# Patient Record
Sex: FEMALE | Race: WHITE
[De-identification: names, ages, dates, MRNs, and addresses within clinical notes are randomized per-mention and may not be internally consistent; named-entity substitution may affect disease eponyms.]

---

## 2017-06-05 ENCOUNTER — HOSPITAL ENCOUNTER (EMERGENCY)
Dept: HOSPITAL 42 - ED | Age: 27
Discharge: HOME | End: 2017-06-05
Payer: COMMERCIAL

## 2017-06-05 VITALS
TEMPERATURE: 99.6 F | SYSTOLIC BLOOD PRESSURE: 138 MMHG | OXYGEN SATURATION: 100 % | DIASTOLIC BLOOD PRESSURE: 89 MMHG | RESPIRATION RATE: 18 BRPM | HEART RATE: 81 BPM

## 2017-06-05 VITALS — BODY MASS INDEX: 18.8 KG/M2

## 2017-06-05 DIAGNOSIS — J20.9: Primary | ICD-10-CM

## 2018-02-27 ENCOUNTER — HOSPITAL ENCOUNTER (EMERGENCY)
Dept: HOSPITAL 42 - ED | Age: 28
LOS: 1 days | Discharge: HOME | End: 2018-02-28
Payer: COMMERCIAL

## 2018-02-27 VITALS — BODY MASS INDEX: 19 KG/M2

## 2018-02-27 VITALS — TEMPERATURE: 98.3 F | RESPIRATION RATE: 18 BRPM

## 2018-02-27 DIAGNOSIS — H74.92: ICD-10-CM

## 2018-02-27 DIAGNOSIS — G43.909: Primary | ICD-10-CM

## 2018-02-27 LAB
ALBUMIN SERPL-MCNC: 4.7 G/DL (ref 3–4.8)
ALBUMIN/GLOB SERPL: 1.2 {RATIO} (ref 1.1–1.8)
ALT SERPL-CCNC: 30 U/L (ref 7–56)
AST SERPL-CCNC: 29 U/L (ref 14–36)
BUN SERPL-MCNC: 15 MG/DL (ref 7–21)
CALCIUM SERPL-MCNC: 9.9 MG/DL (ref 8.4–10.5)
ERYTHROCYTE [DISTWIDTH] IN BLOOD BY AUTOMATED COUNT: 15.3 % (ref 11.5–14.5)
GFR NON-AFRICAN AMERICAN: > 60
HGB BLD-MCNC: 13.5 G/DL (ref 12–16)
LIPASE SERPL-CCNC: 101 U/L (ref 23–300)
MCH RBC QN AUTO: 27 PG (ref 25–35)
MCHC RBC AUTO-ENTMCNC: 32.8 G/DL (ref 31–37)
MCV RBC AUTO: 82.4 FL (ref 80–105)
PLATELET # BLD: 292 10^3/UL (ref 120–450)
PMV BLD AUTO: 10.2 FL (ref 7–11)
RBC # BLD AUTO: 5 10^6/UL (ref 3.5–6.1)
WBC # BLD AUTO: 7.8 10^3/UL (ref 4.5–11)

## 2018-02-27 PROCEDURE — 83690 ASSAY OF LIPASE: CPT

## 2018-02-27 PROCEDURE — 80053 COMPREHEN METABOLIC PANEL: CPT

## 2018-02-27 PROCEDURE — 96374 THER/PROPH/DIAG INJ IV PUSH: CPT

## 2018-02-27 PROCEDURE — 96375 TX/PRO/DX INJ NEW DRUG ADDON: CPT

## 2018-02-27 PROCEDURE — 85027 COMPLETE CBC AUTOMATED: CPT

## 2018-02-27 PROCEDURE — 70450 CT HEAD/BRAIN W/O DYE: CPT

## 2018-02-27 PROCEDURE — 99285 EMERGENCY DEPT VISIT HI MDM: CPT

## 2018-02-28 VITALS — OXYGEN SATURATION: 100 % | SYSTOLIC BLOOD PRESSURE: 128 MMHG | HEART RATE: 88 BPM | DIASTOLIC BLOOD PRESSURE: 88 MMHG

## 2018-10-14 ENCOUNTER — HOSPITAL ENCOUNTER (EMERGENCY)
Dept: HOSPITAL 42 - ED | Age: 28
LOS: 1 days | Discharge: HOME | End: 2018-10-15
Payer: COMMERCIAL

## 2018-10-14 VITALS — RESPIRATION RATE: 18 BRPM

## 2018-10-14 VITALS — BODY MASS INDEX: 19 KG/M2

## 2018-10-14 DIAGNOSIS — N39.0: Primary | ICD-10-CM

## 2018-10-14 DIAGNOSIS — N83.209: ICD-10-CM

## 2018-10-14 LAB
ALBUMIN SERPL-MCNC: 4.8 G/DL (ref 3–4.8)
ALBUMIN/GLOB SERPL: 1.3 {RATIO} (ref 1.1–1.8)
ALT SERPL-CCNC: 38 U/L (ref 7–56)
AMORPH SED URNS QL MICRO: (no result)
APPEARANCE UR: CLEAR
AST SERPL-CCNC: 31 U/L (ref 14–36)
BACTERIA #/AREA URNS HPF: (no result) /[HPF]
BASOPHILS # BLD AUTO: 0.04 K/MM3 (ref 0–2)
BASOPHILS NFR BLD: 0.3 % (ref 0–3)
BILIRUB UR-MCNC: NEGATIVE MG/DL
BUN SERPL-MCNC: 11 MG/DL (ref 7–21)
CALCIUM SERPL-MCNC: 9.6 MG/DL (ref 8.4–10.5)
COLOR UR: YELLOW
EOSINOPHIL # BLD: 0.1 10*3/UL (ref 0–0.7)
EOSINOPHIL NFR BLD: 1.1 % (ref 1.5–5)
ERYTHROCYTE [DISTWIDTH] IN BLOOD BY AUTOMATED COUNT: 13.4 % (ref 11.5–14.5)
GFR NON-AFRICAN AMERICAN: > 60
GLUCOSE UR STRIP-MCNC: NEGATIVE MG/DL
GRANULOCYTES # BLD: 8.17 10*3/UL (ref 1.4–6.5)
GRANULOCYTES NFR BLD: 66.9 % (ref 50–68)
HGB BLD-MCNC: 13.6 G/DL (ref 12–16)
LEUKOCYTE ESTERASE UR-ACNC: (no result) LEU/UL
LIPASE SERPL-CCNC: 128 U/L (ref 23–300)
LYMPHOCYTES # BLD: 3.2 10*3/UL (ref 1.2–3.4)
LYMPHOCYTES NFR BLD AUTO: 25.9 % (ref 22–35)
MCH RBC QN AUTO: 28 PG (ref 25–35)
MCHC RBC AUTO-ENTMCNC: 33.6 G/DL (ref 31–37)
MCV RBC AUTO: 83.3 FL (ref 80–105)
MONOCYTES # BLD AUTO: 0.7 10*3/UL (ref 0.1–0.6)
MONOCYTES NFR BLD: 5.8 % (ref 1–6)
PH UR STRIP: 6 [PH] (ref 4.7–8)
PLATELET # BLD: 292 10^3/UL (ref 120–450)
PMV BLD AUTO: 9.4 FL (ref 7–11)
PROT UR STRIP-MCNC: NEGATIVE MG/DL
RBC # BLD AUTO: 4.86 10^6/UL (ref 3.5–6.1)
RBC # UR STRIP: (no result) /UL
SP GR UR STRIP: <= 1.005 (ref 1–1.03)
UROBILINOGEN UR STRIP-ACNC: 0.2 E.U./DL
WBC # BLD AUTO: 12.2 10^3/UL (ref 4.5–11)
WBC #/AREA URNS HPF: (no result) /HPF (ref 0–6)

## 2018-10-14 PROCEDURE — 99283 EMERGENCY DEPT VISIT LOW MDM: CPT

## 2018-10-14 PROCEDURE — 83735 ASSAY OF MAGNESIUM: CPT

## 2018-10-14 PROCEDURE — 87086 URINE CULTURE/COLONY COUNT: CPT

## 2018-10-14 PROCEDURE — 81001 URINALYSIS AUTO W/SCOPE: CPT

## 2018-10-14 PROCEDURE — 76830 TRANSVAGINAL US NON-OB: CPT

## 2018-10-14 PROCEDURE — 85025 COMPLETE CBC W/AUTO DIFF WBC: CPT

## 2018-10-14 PROCEDURE — 87181 SC STD AGAR DILUTION PER AGT: CPT

## 2018-10-14 PROCEDURE — 96374 THER/PROPH/DIAG INJ IV PUSH: CPT

## 2018-10-14 PROCEDURE — 80053 COMPREHEN METABOLIC PANEL: CPT

## 2018-10-14 PROCEDURE — 83690 ASSAY OF LIPASE: CPT

## 2018-10-15 VITALS
TEMPERATURE: 98.6 F | HEART RATE: 78 BPM | DIASTOLIC BLOOD PRESSURE: 74 MMHG | OXYGEN SATURATION: 99 % | SYSTOLIC BLOOD PRESSURE: 115 MMHG

## 2018-10-22 ENCOUNTER — HOSPITAL ENCOUNTER (OUTPATIENT)
Dept: HOSPITAL 31 - C.SDS | Age: 28
Discharge: HOME | End: 2018-10-22
Attending: OBSTETRICS & GYNECOLOGY
Payer: COMMERCIAL

## 2018-10-22 VITALS — TEMPERATURE: 97.6 F

## 2018-10-22 VITALS — BODY MASS INDEX: 20.5 KG/M2

## 2018-10-22 VITALS
SYSTOLIC BLOOD PRESSURE: 112 MMHG | OXYGEN SATURATION: 99 % | DIASTOLIC BLOOD PRESSURE: 68 MMHG | HEART RATE: 65 BPM | RESPIRATION RATE: 18 BRPM

## 2018-10-22 DIAGNOSIS — D25.9: Primary | ICD-10-CM

## 2018-10-22 DIAGNOSIS — N92.1: ICD-10-CM

## 2018-10-22 DIAGNOSIS — N84.0: ICD-10-CM

## 2018-10-22 PROCEDURE — 88305 TISSUE EXAM BY PATHOLOGIST: CPT

## 2018-10-22 PROCEDURE — 58558 HYSTEROSCOPY BIOPSY: CPT

## 2018-10-22 NOTE — [id number]
Surgeon's Initial Post Op Note





- Surgeon's Notes


Surgeon: Dr. Maguire


Assistant: Brant


Type of Anesthesia: General LMA


Anesthesia Administered By: Dr. Montgomery


Pre-Operative Diagnosis: 27 yo with Fibroid uterus , menorrhagia, irregular 

mentrual cycle


Operative Findings: Hysteroscopy Myoure D and C


Post-Operative Diagnosis: Same as above


Operation Performed: hysteroscopy Myosure D and C


Specimen/Specimens Removed: EMC, ECC, Polyp


Estimated Blood Loss: EBL {In ML}: 10


Blood Products Given: N/A


Drains Used: No Drains


Post-Op Condition: Good


Date of Surgery/Procedure: 10/22/18


Time of Surgery/Procedure: 10:41

## 2018-10-23 NOTE — OP
PROCEDURE DATE:  10/22/2018



PREOPERATIVE DIAGNOSIS:  A 28-year-old female with menorrhagia, irregular

menstrual periods, fibroid uterus, and endometrial polyps.



POSTOPERATIVE DIAGNOSIS:  A 28-year-old female with menorrhagia, irregular

menstrual periods, fibroid uterus, and endometrial polyps.



PROCEDURES:  Hysteroscopy, MyoSure, and dilatation and curettage.



SURGEON:  Ines Maguire MD



ASSISTANT:  None.



TYPE OF ANESTHESIA:  General LMA.



ANESTHESIOLOGIST:  Dr. Montgomery.



FINDINGS:  Anteverted uterus, approximately 12 weeks'  noted to

have multiple leiomyomas,and an endometrial polyp.



COMPLICATIONS:  None.



ESTIMATED BLOOD LOSS:  Approximately 10 mL.



INTRAVENOUS FLUIDS:  500 mL.



SPECIMENS:  EMC, ECC, and polyp.



DESCRIPTION OF PROCEDURE:  The patient was informed of the risk factors,

benefits, and alternatives of the procedures.  Risk factors included

infection, bleeding, damage to surrounding organs and tissue, complications

from anesthesia, and possible death.  After informed consent was obtained

and all questions were answered prior to informed consent, she was then

taken to the operating room, prepped and draped in a normal sterile

fashion, placed in dorsal lithotomy position.  A weighted speculum was

placed into the vagina.  The anterior lip of the cervix was grasped with a

single-tooth tenaculum.  The uterus was gently sounded to approximately 12

cm.  Upon complete uterine dilation, the scope was then introduced, and the

surveillance of the uterine cavity was then performed.  Upon completion, it

was noted that she had an endometrial polyp.  The MyoSure device was

utilized and activated.  Under direct visualization, the polyp was then

removed and submitted to Pathology.  In that instance, the scope was then

removed together with the MyoSure, and fractional D and C was then

performed.  EMC and ECC were submitted to Pathology.  The hysteroscope was

then introduced making sure there was no area of perforation.  Upon

completion, all instruments were removed from the vagina.  Instruments and

lap counts were all correct x 2.  The patient was then taken to recovery

room in stable condition and instructed to follow up in the office in

approximately two weeks.





__________________________________________

Ines Maguire MD





DD:  10/22/2018 14:13:19

DT:  10/22/2018 15:58:09

[facility] # [id number]

RENETTA